# Patient Record
Sex: MALE | Race: WHITE
[De-identification: names, ages, dates, MRNs, and addresses within clinical notes are randomized per-mention and may not be internally consistent; named-entity substitution may affect disease eponyms.]

---

## 2018-04-05 ENCOUNTER — HOSPITAL ENCOUNTER (OUTPATIENT)
Dept: HOSPITAL 45 - C.CTS | Age: 73
Discharge: HOME | End: 2018-04-05
Attending: FAMILY MEDICINE
Payer: COMMERCIAL

## 2018-04-05 DIAGNOSIS — R68.2: ICD-10-CM

## 2018-04-05 DIAGNOSIS — J32.9: Primary | ICD-10-CM

## 2018-04-05 DIAGNOSIS — R73.09: ICD-10-CM

## 2018-04-05 LAB
BASOPHILS # BLD: 0.01 K/UL (ref 0–0.2)
BASOPHILS NFR BLD: 0.2 %
BUN SERPL-MCNC: 23 MG/DL (ref 7–18)
CALCIUM SERPL-MCNC: 8.5 MG/DL (ref 8.5–10.1)
CO2 SERPL-SCNC: 29 MMOL/L (ref 21–32)
CREAT SERPL-MCNC: 1.08 MG/DL (ref 0.6–1.4)
EOS ABS #: 0.12 K/UL (ref 0–0.5)
EOSINOPHIL NFR BLD AUTO: 144 K/UL (ref 130–400)
GLUCOSE SERPL-MCNC: 88 MG/DL (ref 70–99)
HCT VFR BLD CALC: 44.5 % (ref 42–52)
HGB BLD-MCNC: 15.2 G/DL (ref 14–18)
IG#: 0 K/UL (ref 0–0.02)
IMM GRANULOCYTES NFR BLD AUTO: 26.6 %
LYMPHOCYTES # BLD: 1.45 K/UL (ref 1.2–3.4)
MCH RBC QN AUTO: 30.6 PG (ref 25–34)
MCHC RBC AUTO-ENTMCNC: 34.2 G/DL (ref 32–36)
MCV RBC AUTO: 89.5 FL (ref 80–100)
MONO ABS #: 0.57 K/UL (ref 0.11–0.59)
MONOCYTES NFR BLD: 10.5 %
NEUT ABS #: 3.3 K/UL (ref 1.4–6.5)
NEUTROPHILS # BLD AUTO: 2.2 %
NEUTROPHILS NFR BLD AUTO: 60.5 %
PMV BLD AUTO: 11.3 FL (ref 7.4–10.4)
POTASSIUM SERPL-SCNC: 4.2 MMOL/L (ref 3.5–5.1)
RED CELL DISTRIBUTION WIDTH CV: 14 % (ref 11.5–14.5)
RED CELL DISTRIBUTION WIDTH SD: 45.7 FL (ref 36.4–46.3)
SODIUM SERPL-SCNC: 139 MMOL/L (ref 136–145)
WBC # BLD AUTO: 5.45 K/UL (ref 4.8–10.8)

## 2018-04-05 NOTE — DIAGNOSTIC IMAGING REPORT
SINUS CT



CT DOSE: 627.12 mGy.cm



HISTORY:      SINUSITIS,J32.9,R73.09,R68.2



TECHNIQUE: Multiaxial CT images of the paranasal sinuses were performed and

reformatted in the coronal plane without the use of contrast.  A dose lowering

technique was utilized adhering to the principles of ALARA.



COMPARISON:  None.



FINDINGS: The frontal sinuses are clear. There is moderate mucosal thickening

within the ethmoid air cells and floor of the left maxillary sinus. 2.1 cm

retention cyst within the floor of the right maxillary sinus. Small amount of

bubbly secretions within the left sphenoid sinus. The mastoid air cells are

clear. Small defect within the medial wall the left maxillary sinus suggestive

of an antrostomy. The rosendo noris is partially pneumatized. The orbital floors

and lamina papyracea are intact. The right ostiomeatal unit remains patent.

There is an occluded left ostiomeatal unit. The nasal septum is midline. The

orbits are unremarkable.

 

IMPRESSION:  



1. Moderate mucosal thickening within the paranasal sinuses and a small amount

of bubbly secretions within the left sphenoid sinus as described above.

2. The left ostiomeatal unit is narrowed and occluded. However, there is a small

defect within the medial wall the left maxillary sinus suggestive of an

antrostomy.







Electronically signed by:  Nestor Yusuf M.D.

4/5/2018 8:31 AM



Dictated Date/Time:  4/5/2018 8:23 AM

## 2018-04-06 LAB — ANA SCREEN  TC 249X: POSITIVE

## 2018-04-09 LAB — ANA TITR SER: (no result) TITER
